# Patient Record
Sex: MALE | Race: WHITE | NOT HISPANIC OR LATINO | Employment: OTHER | URBAN - NONMETROPOLITAN AREA
[De-identification: names, ages, dates, MRNs, and addresses within clinical notes are randomized per-mention and may not be internally consistent; named-entity substitution may affect disease eponyms.]

---

## 2021-08-30 ENCOUNTER — TRANSCRIBE ORDERS (OUTPATIENT)
Dept: PHYSICAL THERAPY | Facility: CLINIC | Age: 61
End: 2021-08-30

## 2021-08-30 DIAGNOSIS — S16.1XXD STRAIN OF NECK MUSCLE, SUBSEQUENT ENCOUNTER: Primary | ICD-10-CM

## 2021-08-30 DIAGNOSIS — S29.019D THORACIC MYOFASCIAL STRAIN, SUBSEQUENT ENCOUNTER: ICD-10-CM

## 2021-09-01 ENCOUNTER — TREATMENT (OUTPATIENT)
Dept: PHYSICAL THERAPY | Facility: CLINIC | Age: 61
End: 2021-09-01

## 2021-09-01 DIAGNOSIS — S29.019D THORACIC MYOFASCIAL STRAIN, SUBSEQUENT ENCOUNTER: ICD-10-CM

## 2021-09-01 DIAGNOSIS — S16.1XXD STRAIN OF NECK MUSCLE, SUBSEQUENT ENCOUNTER: Primary | ICD-10-CM

## 2021-09-01 PROCEDURE — 97162 PT EVAL MOD COMPLEX 30 MIN: CPT | Performed by: PHYSICAL THERAPIST

## 2021-09-01 PROCEDURE — 97014 ELECTRIC STIMULATION THERAPY: CPT | Performed by: PHYSICAL THERAPIST

## 2021-09-01 PROCEDURE — 97110 THERAPEUTIC EXERCISES: CPT | Performed by: PHYSICAL THERAPIST
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
## 2021-09-07 ENCOUNTER — TREATMENT (OUTPATIENT)
Dept: PHYSICAL THERAPY | Facility: CLINIC | Age: 61
End: 2021-09-07

## 2021-09-07 DIAGNOSIS — S29.019D THORACIC MYOFASCIAL STRAIN, SUBSEQUENT ENCOUNTER: Primary | ICD-10-CM

## 2021-09-07 DIAGNOSIS — S16.1XXD STRAIN OF NECK MUSCLE, SUBSEQUENT ENCOUNTER: ICD-10-CM

## 2021-09-07 PROCEDURE — 97140 MANUAL THERAPY 1/> REGIONS: CPT | Performed by: PHYSICAL THERAPIST

## 2021-09-07 PROCEDURE — 97014 ELECTRIC STIMULATION THERAPY: CPT | Performed by: PHYSICAL THERAPIST

## 2021-09-07 PROCEDURE — 97110 THERAPEUTIC EXERCISES: CPT | Performed by: PHYSICAL THERAPIST

## 2021-09-14 ENCOUNTER — TREATMENT (OUTPATIENT)
Dept: PHYSICAL THERAPY | Facility: CLINIC | Age: 61
End: 2021-09-14

## 2021-09-14 DIAGNOSIS — S16.1XXD STRAIN OF NECK MUSCLE, SUBSEQUENT ENCOUNTER: ICD-10-CM

## 2021-09-14 DIAGNOSIS — S29.019D THORACIC MYOFASCIAL STRAIN, SUBSEQUENT ENCOUNTER: Primary | ICD-10-CM

## 2021-09-14 PROCEDURE — 97032 APPL MODALITY 1+ESTIM EA 15: CPT | Performed by: PHYSICAL THERAPIST

## 2021-09-14 PROCEDURE — 97110 THERAPEUTIC EXERCISES: CPT | Performed by: PHYSICAL THERAPIST

## 2021-09-14 PROCEDURE — 97140 MANUAL THERAPY 1/> REGIONS: CPT | Performed by: PHYSICAL THERAPIST

## 2021-09-14 NOTE — PROGRESS NOTES
"  Patient: David Dubois   : 1960  Referring practitioner: Kelly Mendez, АННА, APRN  Date of Initial Visit: Type: THERAPY  Noted: 2021  Today's Date: 2021  Patient seen for 3 sessions           Subjective Evaluation    History of Present Illness    Subjective comment: Pt reports having 8/10 pain today.  Pt reported being sore following last session.  Pt reports that he would like to get his neck \"popped\".       Objective   See Exercise, Manual, and Modality Logs for complete treatment.       Assessment & Plan     Assessment  Assessment details: Tx today consisted of mh and estim to cervical region followed by conservative exercises as tolerated and ended with stm to thoracic region.  Pt responded fair to tx with reports of 4/10 post pain.  Pt reported pain with most exercises today and was instructed to perform in the pain free range if able.    Plan  Plan details: Will follow progressing mobility and decreased pain.        Visit Diagnoses:    ICD-10-CM ICD-9-CM   1. Thoracic myofascial strain, subsequent encounter  S29.019D V58.89     847.1   2. Strain of neck muscle, subsequent encounter  S16.1XXD V58.89     847.0       Progress strengthening /stabilization /functional activity           Timed:  Manual Therapy:    12     mins  81739;  Therapeutic Exercise:    16     mins  72042;     Neuromuscular Abbie:        mins  36246;    Therapeutic Activity:          mins  89477;     Gait Training:           mins  06993;     Ultrasound:          mins  56684;    Electrical Stimulation:         mins  34505 ( );    Untimed:  Electrical Stimulation:    15     mins  61938 ( );  Mechanical Traction:         mins  90813;     Timed Treatment:   28   mins   Total Treatment:     43   mins  Rodrigo Dwyer, PT  Physical Therapist                  "

## 2021-09-21 ENCOUNTER — TREATMENT (OUTPATIENT)
Dept: PHYSICAL THERAPY | Facility: CLINIC | Age: 61
End: 2021-09-21

## 2021-09-21 DIAGNOSIS — S29.019D THORACIC MYOFASCIAL STRAIN, SUBSEQUENT ENCOUNTER: Primary | ICD-10-CM

## 2021-09-21 DIAGNOSIS — S16.1XXD STRAIN OF NECK MUSCLE, SUBSEQUENT ENCOUNTER: ICD-10-CM

## 2021-09-21 PROCEDURE — 97140 MANUAL THERAPY 1/> REGIONS: CPT | Performed by: PHYSICAL THERAPIST

## 2021-09-21 PROCEDURE — 97110 THERAPEUTIC EXERCISES: CPT | Performed by: PHYSICAL THERAPIST

## 2021-09-21 PROCEDURE — 97014 ELECTRIC STIMULATION THERAPY: CPT | Performed by: PHYSICAL THERAPIST

## 2021-09-21 NOTE — PROGRESS NOTES
Physical Therapy Daily Treatment Note      Patient: David Dubois   : 1960  Referring practitioner: Kelly Mendez, DNP, APRN  Date of Initial Visit: Type: THERAPY  Noted: 2021  Today's Date: 2021  Patient seen for 4 sessions           Subjective Evaluation    History of Present Illness    Subjective comment: Pt reports 8/10 neck pain prior to today's session.        Objective   See Exercise, Manual, and Modality Logs for complete treatment.       Assessment & Plan     Assessment  Assessment details: Today's session was initiated with IFC to the cervical region combined with MH to bilateral upper trapezius muscles and the lumbar region. No skin irritation was observed following modalities. This was followed by STM and trigger point release to address muscle tightness and multiple trigger points throughout bilateral upper trapezius muscles and bilateral medial scapular borders. The patient reported tenderness to palpation of trigger points. Therapeutic exercises were performed for improved cervical ROM and scapular stability, with minimal cervical flexion and extension present. The patient reported pain with cervical AROM. The patient reported 6/10 cervical and thoracic pain following today's treatment.    Plan  Plan details: Progress ROM activities as tolerated for improved functional independence.        Visit Diagnoses:    ICD-10-CM ICD-9-CM   1. Thoracic myofascial strain, subsequent encounter  S29.019D V58.89     847.1   2. Strain of neck muscle, subsequent encounter  S16.1XXD V58.89     847.0       Progress per Plan of Care           Timed:  Manual Therapy:    10     mins  20325;  Therapeutic Exercise:   17   mins  90037;     Neuromuscular Abbie:        mins  55836;    Therapeutic Activity:          mins  12887;     Gait Training:           mins  45967;     Ultrasound:          mins  86042;    Electrical Stimulation:         mins  61279 ( );    Untimed:  Electrical Stimulation:   15     mins  20052 ( );  Mechanical Traction:         mins  02242;     Timed Treatment:   27   mins   Total Treatment:     42   mins    Ashley Claudene Dalton, PT  Physical Therapist

## 2021-09-27 ENCOUNTER — TRANSCRIBE ORDERS (OUTPATIENT)
Dept: ADMINISTRATIVE | Facility: HOSPITAL | Age: 61
End: 2021-09-27

## 2021-09-27 DIAGNOSIS — S39.012A CHRONIC SACROILIAC STRAIN, INITIAL ENCOUNTER: ICD-10-CM

## 2021-09-27 DIAGNOSIS — S16.1XXA STRAIN OF STERNOCLEIDOMASTOID MUSCLE, INITIAL ENCOUNTER: Primary | ICD-10-CM

## 2021-09-28 ENCOUNTER — TREATMENT (OUTPATIENT)
Dept: PHYSICAL THERAPY | Facility: CLINIC | Age: 61
End: 2021-09-28

## 2021-09-28 DIAGNOSIS — S16.1XXD STRAIN OF NECK MUSCLE, SUBSEQUENT ENCOUNTER: ICD-10-CM

## 2021-09-28 DIAGNOSIS — S29.019D THORACIC MYOFASCIAL STRAIN, SUBSEQUENT ENCOUNTER: Primary | ICD-10-CM

## 2021-09-28 PROCEDURE — 97014 ELECTRIC STIMULATION THERAPY: CPT | Performed by: PHYSICAL THERAPIST

## 2021-09-28 PROCEDURE — 97035 APP MDLTY 1+ULTRASOUND EA 15: CPT | Performed by: PHYSICAL THERAPIST

## 2021-09-28 PROCEDURE — 97110 THERAPEUTIC EXERCISES: CPT | Performed by: PHYSICAL THERAPIST

## 2021-09-28 NOTE — PROGRESS NOTES
Discharge/Progress Report  Patient: David Dubois   : 1960  Diagnosis/ICD-10 Code:  Thoracic myofascial strain, subsequent encounter [S29.019D]  Referring practitioner: Kelly Mendez DNP, APRN  Date of Initial Visit: Type: THERAPY  Noted: 2021  Today's Date: 2021  Patient seen for 5 sessions    Visit Diagnoses:    ICD-10-CM ICD-9-CM   1. Thoracic myofascial strain, subsequent encounter  S29.019D V58.89     847.1   2. Strain of neck muscle, subsequent encounter  S16.1XXD V58.89     847.0       Subjective:   David Dubois reports:   Subjective Questionnaire: NDI:92  Clinical Progress: worse  Home Program Compliance: Yes  Treatment has included: therapeutic exercise, neuromuscular re-education, manual therapy, therapeutic activity, electrical stimulation, ultrasound, moist heat and cryotherapy    Subjective Evaluation    History of Present Illness    Subjective comment: Pt reports having increased neck pain today at 10/10.Pain  Current pain rating: 10  At best pain ratin  At worst pain rating: 10  Location: neck and right arm         Objective          Active Range of Motion   Cervical/Thoracic Spine   Cervical    Flexion: 10 degrees   Extension: 15 degrees   Left lateral flexion: 12 degrees   Right lateral flexion: 5 degrees   Left rotation: 25 degrees   Right rotation: 22 degrees     Lumbar   Flexion: 50 degrees   Extension: 25 degrees   Left rotation: 25 degrees   Right rotation: 25 degrees     Strength/Myotome Testing     Left Shoulder     Planes of Motion   Flexion: 3-     Right Shoulder     Planes of Motion   Flexion: 3-     Left Elbow   Flexion: 3-  Extension: 3-    Right Elbow   Flexion: 3-  Extension: 3-    Left Hip   Planes of Motion   Flexion: 3+    Right Hip   Planes of Motion   Flexion: 3+    Left Knee   Flexion: 3+  Extension: 3+    Right Knee   Flexion: 3+  Extension: 3+      Assessment & Plan     Assessment  Impairments: abnormal coordination, abnormal muscle firing, abnormal muscle  tone, abnormal or restricted ROM, activity intolerance, impaired balance, impaired physical strength, lacks appropriate home exercise program, pain with function and safety issue  Assessment details: Pt is a 60 y/o male referred to therapy for treatment of cervical and thoracic pain. Pt has demonstrated little gains with therapy due to increased guarding and increased pain.  Pt rates his pain on average at 10/10 and pt unable to perform movement due to pain.  Pt has met no progress and will be discharged at this time.  Prognosis: fair  Functional Limitations: carrying objects, lifting, walking, pulling, pushing, uncomfortable because of pain, moving in bed, standing, stooping, reaching behind back, reaching overhead and unable to perform repetitive tasks  Goals  Plan Goals: STG 6 weeks    1 Pt will be instructed in a HEP.not met  2 Pt will report pain no greater than 6/10 with ADLs.not met  3 Pt will demonstrate 45 degrees of bilateral cervical rotation to improved with driving.not met    LTG 12 weeks    1 Pt will report pain no greater than 3/10 with moderate lifting.not met  2 Pt will demonstrate 4/5 UE gross strength.not met  3 Pt will improve lumbar flexion to 75% to assist with occupational tasks.not met    Plan  Therapy options: will not be seen for skilled physical therapy services  Plan details: Will discharged at this time.  Pt has been advised to follow up with his MD due to increased pain.          Visit Diagnoses:    ICD-10-CM ICD-9-CM   1. Thoracic myofascial strain, subsequent encounter  S29.019D V58.89     847.1   2. Strain of neck muscle, subsequent encounter  S16.1XXD V58.89     847.0       Progress toward previous goals: Not Met    New Goals  Short-term goals (STG): 0/3  Long-term goals (LTG): 0/3      Recommendations: Discharge  Timeframe:   Prognosis to achieve goals: poor    PT Signature: Rodrigo Dwyer, PT      Based upon review of the patient's progress and continued therapy plan, it is  my medical opinion that David Dubois should continue physical therapy treatment at [facility]  [address]  [address]  [place] [address] [phone].    Signature: __________________________________  Klely Mendez, АННА, APRN    Timed:  Manual Therapy:         mins  58712;  Therapeutic Exercise:    27     mins  97084;     Neuromuscular Abbie:        mins  46656;    Therapeutic Activity:          mins  68546;     Gait Training:           mins  92229;     Ultrasound:     8     mins  52986;    Electrical Stimulation:         mins  59475 ( );    Untimed:  Electrical Stimulation:    10     mins  72838 ( );  Mechanical Traction:         mins  39793;     Timed Treatment:   35   mins   Total Treatment:     45   mins

## 2021-10-14 ENCOUNTER — HOSPITAL ENCOUNTER (OUTPATIENT)
Dept: MRI IMAGING | Facility: HOSPITAL | Age: 61
Discharge: HOME | End: 2021-10-14

## 2021-10-14 DIAGNOSIS — S39.012A CHRONIC SACROILIAC STRAIN, INITIAL ENCOUNTER: ICD-10-CM

## 2021-10-14 DIAGNOSIS — S16.1XXA STRAIN OF STERNOCLEIDOMASTOID MUSCLE, INITIAL ENCOUNTER: ICD-10-CM

## 2021-10-14 PROCEDURE — 72148 MRI LUMBAR SPINE W/O DYE: CPT | Performed by: RADIOLOGY

## 2021-10-14 PROCEDURE — 72141 MRI NECK SPINE W/O DYE: CPT

## 2021-10-14 PROCEDURE — 72148 MRI LUMBAR SPINE W/O DYE: CPT

## 2021-10-14 PROCEDURE — 72141 MRI NECK SPINE W/O DYE: CPT | Performed by: RADIOLOGY
